# Patient Record
Sex: FEMALE | Race: BLACK OR AFRICAN AMERICAN | Employment: FULL TIME | URBAN - METROPOLITAN AREA
[De-identification: names, ages, dates, MRNs, and addresses within clinical notes are randomized per-mention and may not be internally consistent; named-entity substitution may affect disease eponyms.]

---

## 2017-01-06 ENCOUNTER — OFFICE VISIT (OUTPATIENT)
Dept: PRIMARY CARE CLINIC | Age: 57
End: 2017-01-06

## 2017-01-06 VITALS
HEIGHT: 65 IN | BODY MASS INDEX: 31.19 KG/M2 | OXYGEN SATURATION: 100 % | TEMPERATURE: 98.1 F | WEIGHT: 187.2 LBS | HEART RATE: 58 BPM | SYSTOLIC BLOOD PRESSURE: 128 MMHG | DIASTOLIC BLOOD PRESSURE: 82 MMHG | RESPIRATION RATE: 16 BRPM

## 2017-01-06 DIAGNOSIS — H65.03 BILATERAL ACUTE SEROUS OTITIS MEDIA, RECURRENCE NOT SPECIFIED: Primary | ICD-10-CM

## 2017-01-06 RX ORDER — LOSARTAN POTASSIUM 50 MG/1
TABLET ORAL
Refills: 3 | COMMUNITY
Start: 2016-12-23

## 2017-01-06 RX ORDER — AZITHROMYCIN 250 MG/1
TABLET, FILM COATED ORAL
Qty: 6 TAB | Refills: 0 | Status: SHIPPED | OUTPATIENT
Start: 2017-01-06 | End: 2017-01-11

## 2017-01-06 NOTE — PATIENT INSTRUCTIONS
pseudoephedrine hydrochloride 4 hour formula. Middle Ear Fluid: Care Instructions  Your Care Instructions    Fluid often builds up inside the ear during a cold or allergies. Usually the fluid drains away, but sometimes a small tube in the ear, called the eustachian tube, stays blocked for months. Symptoms of fluid buildup may include:  · Popping, ringing, or a feeling of fullness or pressure in the ear. · Trouble hearing. · Balance problems and dizziness. In most cases, you can treat yourself at home. Follow-up care is a key part of your treatment and safety. Be sure to make and go to all appointments, and call your doctor if you are having problems. It's also a good idea to know your test results and keep a list of the medicines you take. How can you care for yourself at home? · In most cases, the fluid clears up within a few months without treatment. You may need more tests if the fluid does not clear up after 3 months. · If your doctor prescribed antibiotics, take them as directed. Do not stop taking them just because you feel better. You need to take the full course of antibiotics. When should you call for help? Watch closely for changes in your health, and be sure to contact your doctor if:  · You have pain or a fever. · You have any new symptoms, such as hearing problems. · You do not get better as expected. Where can you learn more? Go to http://amena-dea.info/. Enter Z334 in the search box to learn more about \"Middle Ear Fluid: Care Instructions. \"  Current as of: July 29, 2016  Content Version: 11.1  © 8695-5439 [employer]. Care instructions adapted under license by [employer] (which disclaims liability or warranty for this information).  If you have questions about a medical condition or this instruction, always ask your healthcare professional. [address] any warranty or liability for your use of this information.

## 2017-01-06 NOTE — MR AVS SNAPSHOT
Visit Information Date & Time Provider Department Dept. Phone Encounter #  
 1/6/2017  3:45 PM Lourdes Chopra, [address] [phone] [id number] Follow-up Instructions Return if symptoms worsen or fail to improve. Upcoming Health Maintenance Date Due Hepatitis C Screening 1960 DTaP/Tdap/Td series (1 - Tdap) 8/4/1979 PAP AKA CERVICAL CYTOLOGY 8/4/1979 FOBT Q 1 YEAR AGE 50-75 8/4/2008 BREAST CANCER SCRN MAMMOGRAM 8/19/2011 INFLUENZA AGE 9 TO ADULT 8/1/2016 Allergies as of 1/6/2017  Review Complete On: 1/6/2017 By: Lourdes Chopra NP Severity Noted Reaction Type Reactions Codeine  06/11/2012   Intolerance Nausea and Vomiting Pcn [Penicillins]  06/11/2012   Systemic Hives, Swelling Percocet [Oxycodone-acetaminophen]  03/16/2016    Nausea and Vomiting  
 Sulfa (Sulfonamide Antibiotics)  06/11/2012   Intolerance Other (comments) Headache Current Immunizations  Reviewed on 12/11/2013 Name Date Influenza Vaccine 9/11/2013 Not reviewed this visit You Were Diagnosed With   
  
 Codes Comments Bilateral acute serous otitis media, recurrence not specified    -  Primary ICD-10-CM: H65.03 
ICD-9-CM: 381.01 Vitals BP Pulse Temp Resp Height(growth percentile) Weight(growth percentile) 128/82 (!) 58 98.1 °F (36.7 °C) (Oral) 16 5' 5\" (1.651 m) 187 lb 3.2 oz (84.9 kg) LMP SpO2 BMI OB Status Smoking Status 12/10/2013 100% 31.15 kg/m2 Postmenopausal Never Smoker Vitals History BMI and BSA Data Body Mass Index Body Surface Area  
 31.15 kg/m 2 1.97 m 2 Preferred Pharmacy Pharmacy Name Phone [id number] [place], [facility] [address] [phone] Your Updated Medication List  
  
   
This list is accurate as of: 1/6/17  5:17 PM.  Always use your most recent med list.  
  
  
  
  
 azithromycin 250 mg tablet Commonly known as:  Rachel Tubbs Take 2 tablets today, then take 1 tablet daily  
  
 losartan 50 mg tablet Commonly known as:  COZAAR  
TAKE 1-2 TABLETS BY MOUTH DAILY FOR BLOOD PRESSURE  
  
 meloxicam 15 mg tablet Commonly known as:  MOBIC Take 15 mg by mouth daily. Omeprazole delayed release 20 mg tablet Commonly known as:  PRILOSEC D/R Take 20 mg by mouth as needed. ZyrTEC 10 mg tablet Generic drug:  cetirizine Take 10 mg by mouth daily. Prescriptions Sent to Pharmacy Refills  
 azithromycin (ZITHROMAX) 250 mg tablet 0 Sig: Take 2 tablets today, then take 1 tablet daily Class: Normal  
 Pharmacy: [facility] #[id number] #: [phone] Follow-up Instructions Return if symptoms worsen or fail to improve. Patient Instructions   
 
pseudoephedrine hydrochloride 4 hour formula. Middle Ear Fluid: Care Instructions Your Care Instructions Fluid often builds up inside the ear during a cold or allergies. Usually the fluid drains away, but sometimes a small tube in the ear, called the eustachian tube, stays blocked for months. Symptoms of fluid buildup may include: · Popping, ringing, or a feeling of fullness or pressure in the ear. · Trouble hearing. · Balance problems and dizziness. In most cases, you can treat yourself at home. Follow-up care is a key part of your treatment and safety. Be sure to make and go to all appointments, and call your doctor if you are having problems. It's also a good idea to know your test results and keep a list of the medicines you take. How can you care for yourself at home? · In most cases, the fluid clears up within a few months without treatment. You may need more tests if the fluid does not clear up after 3 months. · If your doctor prescribed antibiotics, take them as directed.  Do not stop taking them just because you feel better. You need to take the full course of antibiotics. When should you call for help? Watch closely for changes in your health, and be sure to contact your doctor if: 
· You have pain or a fever. · You have any new symptoms, such as hearing problems. · You do not get better as expected. Where can you learn more? Go to http://amena-dea.info/. Enter R485 in the search box to learn more about \"Middle Ear Fluid: Care Instructions. \" Current as of: July 29, 2016 Content Version: 11.1 © 1522-5010 [employer]eeo. Care instructions adapted under license by [employer] (which disclaims liability or warranty for this information). If you have questions about a medical condition or this instruction, always ask your healthcare professional. [address] any warranty or liability for your use of this information. Introducing [facility]! Denzel De Leon introduces [employer] Interactive patient portal. Now you can access parts of your medical record, email your doctor's office, and request medication refills online. 1. In your internet browser, go to [email or web address] Ad [email or web address] 2. Click on the First Time User? Click Here link in the Sign In box. You will see the New Member Sign Up page. 3. Enter your [employer] Interactive Access Code exactly as it appears below. You will not need to use this code after youve completed the sign-up process. If you do not sign up before the expiration date, you must request a new code. · [employer] Interactive Access Code: [email or web address] Expires: 4/6/2017  5:17 PM 
 
4. Enter the last four digits of your Social Security Number (xxxx) and Date of Birth (mm/dd/yyyy) as indicated and click Submit. You will be taken to the next sign-up page. 5. Create a [employer] Interactive ID. This will be your [employer] login ID and cannot be changed, so think of one that is secure and easy to remember. 6. Create a [employer] password. You can change your password at any time. 7. Enter your Password Reset Question and Answer. This can be used at a later time if you forget your password. 8. Enter your e-mail address. You will receive e-mail notification when new information is available in [address] 9. Click Sign Up. You can now view and download portions of your medical record. 10. Click the Download Summary menu link to download a portable copy of your medical information. If you have questions, please visit the Frequently Asked Questions section of the [employer] website. Remember, [employer] is NOT to be used for urgent needs. For medical emergencies, dial 911. Now available from your iPhone and Android! Please provide this summary of care documentation to your next provider. Your primary care clinician is listed as [facility]. If you have any questions after today's visit, please call [phone].

## 2017-01-06 NOTE — PROGRESS NOTES
Chief Complaint   Patient presents with    Ear Pain     c/o L ear pain    Nasal Congestion   Pt here today c/o above symptoms x 3 days, has tried Tylenol Cold and Sinus to help with discomfort

## 2019-02-11 ENCOUNTER — OFFICE VISIT (OUTPATIENT)
Dept: PRIMARY CARE CLINIC | Age: 59
End: 2019-02-11

## 2019-02-11 VITALS
SYSTOLIC BLOOD PRESSURE: 114 MMHG | OXYGEN SATURATION: 99 % | WEIGHT: 182.6 LBS | BODY MASS INDEX: 30.42 KG/M2 | RESPIRATION RATE: 16 BRPM | TEMPERATURE: 97.7 F | HEART RATE: 62 BPM | HEIGHT: 65 IN | DIASTOLIC BLOOD PRESSURE: 77 MMHG

## 2019-02-11 DIAGNOSIS — H92.03 OTALGIA OF BOTH EARS: Primary | ICD-10-CM

## 2019-02-11 NOTE — PROGRESS NOTES
Benjamine Pallas is a 59y.o. year old female who presents for evaluation of pain in her ears for the last couple of days. On examination there are no s/s of infection noted, TM bilaterally are grey in color, no redness, no bulging. Patient reports she had cleaned them with peroxide and just wanted them to be checked for infection because they were irritated from the cleaning. Reviewed PmHx, RxHx, FmHx, SocHx, AllgHx and updated and dated in the chart. Review of Systems - negative except as listed above in the HPI    Objective:     Vitals:    02/11/19 1249   BP: 114/77   Pulse: 62   Resp: 16   Temp: 97.7 °F (36.5 °C)   TempSrc: Oral   SpO2: 99%   Weight: 182 lb 9.6 oz (82.8 kg)   Height: 5' 5\" (1.651 m)       Current Outpatient Medications   Medication Sig    losartan (COZAAR) 50 mg tablet TAKE 1-2 TABLETS BY MOUTH DAILY FOR BLOOD PRESSURE    Omeprazole delayed release (PRILOSEC D/R) 20 mg tablet Take 20 mg by mouth as needed.  cetirizine (ZYRTEC) 10 mg tablet Take 10 mg by mouth daily.  meloxicam (MOBIC) 15 mg tablet Take 15 mg by mouth daily. No current facility-administered medications for this visit. Physical Examination: . Physical Exam      Assessment/ Plan:   Diagnoses and all orders for this visit:    1. Otalgia of both ears         Follow-up Disposition: Not on File    I have discussed the diagnosis with the patient and the intended plan as seen in the above orders. The patient has received an after-visit summary and questions were answered concerning future plans. Pt conveyed understanding of plan. Medication Side Effects and Warnings were discussed with patient      Shalini Smith, NP    Visit Vitals  /77   Pulse 62   Temp 97.7 °F (36.5 °C) (Oral)   Resp 16   Ht 5' 5\" (1.651 m)   Wt 182 lb 9.6 oz (82.8 kg)   LMP 12/10/2013   SpO2 99%   BMI 30.39 kg/m²     Spoke with the patient regarding their blood pressure (BP) reading at today's visit.   The patient verbalized understanding of need to maintain BP lower than 140/90. The patient will follow up with their primary care physician regarding management and/or medications that may be needed. Drepssion Screening has been completed. The patient isdenies having a history of depression. The patient is nottaking medication and is being followed by their PCP at this time. This patient does  have a primary care physician. Referral was not given a referral at todays visit. Immunizations: The patient is current on their influenza immunization at this time. The patient does not   want to receive the influenza immunization today. Follow up instructions given at today's visit were verbalized by the patient/parent. The signs of infection are fever > 100.4, increase fatigue, change in mental status, or decrease in urinary output. The patient/parent verbalized understanding of taking medications prescribed during this visit as prescribed.

## 2019-02-13 NOTE — PATIENT INSTRUCTIONS
Earache: Care Instructions  Your Care Instructions    Even though infection is a common cause of ear pain, not all ear pain means an infection. If you have ear pain and don't have an infection, it could be because of a jaw problem, such as temporomandibular joint (TMJ) pain. Or it could be because of a neck problem. When ear discomfort or pain is mild or comes and goes without other symptoms, home treatment may be all you need. Follow-up care is a key part of your treatment and safety. Be sure to make and go to all appointments, and call your doctor if you are having problems. It's also a good idea to know your test results and keep a list of the medicines you take. How can you care for yourself at home? · Apply heat on the ear to ease pain. To apply heat, put a warm water bottle, a heating pad set on low, or a warm cloth on your ear. Do not go to sleep with a heating pad on your skin. · Take an over-the-counter pain medicine, such as acetaminophen (Tylenol), ibuprofen (Advil, Motrin), or naproxen (Aleve). Be safe with medicines. Read and follow all instructions on the label. · Do not take two or more pain medicines at the same time unless the doctor told you to. Many pain medicines have acetaminophen, which is Tylenol. Too much acetaminophen (Tylenol) can be harmful. · Never insert anything, such as a cotton swab or a virgie pin, into the ear. When should you call for help? Call your doctor now or seek immediate medical care if:    · You have new or worse symptoms of infection, such as:  ? Increased pain, swelling, warmth, or redness. ? Red streaks leading from the area. ? Pus draining from the area. ? A fever.    Watch closely for changes in your health, and be sure to contact your doctor if:    · You have new or worse discharge coming from the ear.     · You do not get better as expected. Where can you learn more? Go to http://amena-dea.info/.   Enter Z440 in the search box to learn more about \"Earache: Care Instructions. \"  Current as of: March 27, 2018  Content Version: 11.9  © 4346-8475 [employer]. Care instructions adapted under license by [employer] (which disclaims liability or warranty for this information). If you have questions about a medical condition or this instruction, always ask your healthcare professional. [address] any warranty or liability for your use of this information.

## 2019-03-20 ENCOUNTER — OFFICE VISIT (OUTPATIENT)
Dept: PRIMARY CARE CLINIC | Age: 59
End: 2019-03-20

## 2019-03-20 VITALS
WEIGHT: 180 LBS | OXYGEN SATURATION: 98 % | TEMPERATURE: 100 F | HEART RATE: 83 BPM | SYSTOLIC BLOOD PRESSURE: 124 MMHG | RESPIRATION RATE: 18 BRPM | DIASTOLIC BLOOD PRESSURE: 74 MMHG | HEIGHT: 65 IN | BODY MASS INDEX: 29.99 KG/M2

## 2019-03-20 DIAGNOSIS — J06.9 VIRAL UPPER RESPIRATORY TRACT INFECTION: ICD-10-CM

## 2019-03-20 DIAGNOSIS — R05.9 COUGH: Primary | ICD-10-CM

## 2019-03-20 RX ORDER — LEVOCETIRIZINE DIHYDROCHLORIDE 5 MG/1
5 TABLET, FILM COATED ORAL DAILY
Qty: 30 TAB | Refills: 0 | Status: SHIPPED | OUTPATIENT
Start: 2019-03-20 | End: 2019-04-19

## 2019-03-20 RX ORDER — BENZONATATE 200 MG/1
200 CAPSULE ORAL
Qty: 21 CAP | Refills: 0 | Status: SHIPPED | OUTPATIENT
Start: 2019-03-20 | End: 2019-03-27

## 2019-03-20 NOTE — PATIENT INSTRUCTIONS
Cough: Care Instructions Your Care Instructions A cough is your body's response to something that bothers your throat or airways. Many things can cause a cough. You might cough because of a cold or the flu, bronchitis, or asthma. Smoking, postnasal drip, allergies, and stomach acid that backs up into your throat also can cause coughs. A cough is a symptom, not a disease. Most coughs stop when the cause, such as a cold, goes away. You can take a few steps at home to cough less and feel better. Follow-up care is a key part of your treatment and safety. Be sure to make and go to all appointments, and call your doctor if you are having problems. It's also a good idea to know your test results and keep a list of the medicines you take. How can you care for yourself at home? · Drink lots of water and other fluids. This helps thin the mucus and soothes a dry or sore throat. Honey or lemon juice in hot water or tea may ease a dry cough. · Take cough medicine as directed by your doctor. · Prop up your head on pillows to help you breathe and ease a dry cough. · Try cough drops to soothe a dry or sore throat. Cough drops don't stop a cough. Medicine-flavored cough drops are no better than candy-flavored drops or hard candy. · Do not smoke. Avoid secondhand smoke. If you need help quitting, talk to your doctor about stop-smoking programs and medicines. These can increase your chances of quitting for good. When should you call for help? Call 911 anytime you think you may need emergency care.  For example, call if: 
  · You have severe trouble breathing.  
 Call your doctor now or seek immediate medical care if: 
  · You cough up blood.  
  · You have new or worse trouble breathing.  
  · You have a new or higher fever.  
  · You have a new rash.  
 Watch closely for changes in your health, and be sure to contact your doctor if: 
  · You cough more deeply or more often, especially if you notice more mucus or a change in the color of your mucus.  
  · You have new symptoms, such as a sore throat, an earache, or sinus pain.  
  · You do not get better as expected. Where can you learn more? Go to http://amena-dea.info/. Enter D279 in the search box to learn more about \"Cough: Care Instructions. \" Current as of: September 5, 2018 Content Version: 11.9 © 8519-2129 [employer]. Care instructions adapted under license by [employer] (which disclaims liability or warranty for this information). If you have questions about a medical condition or this instruction, always ask your healthcare professional. [place] [address] any warranty or liability for your use of this information.

## 2019-03-20 NOTE — PROGRESS NOTES
Chief Complaint Patient presents with  Cough Pt c/o cough x 1 week, denies nausea vomiting or fever,states she has tried otc tylenol cold and flu to help with discomfort This note will not be viewable in [address]

## 2019-03-20 NOTE — PROGRESS NOTES
Subjective:  
Neri Gutierrez is a 59 y.o. female who complains of coryza, congestion and productive cough for 5 days, gradually improving since that time. She denies a history of anorexia, chest pain, chills, dizziness, fatigue, fevers, myalgias, nausea, shortness of breath, sweats, vomiting, weakness, weight loss, wheezing and cough. Evaluation to date: none. Treatment to date: OTC products. Patient does not smoke cigarettes. Relevant PMH: No pertinent additional PMH. Patient Active Problem List  
Diagnosis Code  Menorrhagia N92.0  Fibroids D21.9 Patient Active Problem List  
 Diagnosis Date Noted  Menorrhagia 2013  Fibroids 2013 Current Outpatient Medications Medication Sig Dispense Refill  levocetirizine (XYZAL) 5 mg tablet Take 1 Tab by mouth daily for 30 days. 30 Tab 0  
 benzonatate (TESSALON) 200 mg capsule Take 1 Cap by mouth three (3) times daily as needed for Cough for up to 7 days. 21 Cap 0  
 losartan (COZAAR) 50 mg tablet TAKE 1-2 TABLETS BY MOUTH DAILY FOR BLOOD PRESSURE  3  
 Omeprazole delayed release (PRILOSEC D/R) 20 mg tablet Take 20 mg by mouth as needed.  meloxicam (MOBIC) 15 mg tablet Take 15 mg by mouth daily. Allergies Allergen Reactions  Codeine Nausea and Vomiting  Pcn [Penicillins] Hives and Swelling  Percocet [Oxycodone-Acetaminophen] Nausea and Vomiting  Sulfa (Sulfonamide Antibiotics) Other (comments) Headache Past Medical History:  
Diagnosis Date  Arthritis  GERD (gastroesophageal reflux disease) Past Surgical History:  
Procedure Laterality Date  COLONOSCOPY    
 HX  SECTION    
 HX GYN  13 TOTAL LAPAROSCOPIC HYSTERECTOMY AND BILATERAL SALPINGO OOPHRECTOMY & CYSTOSCOPY  
 HX ORTHOPAEDIC    
 knee scope Family History Problem Relation Age of Onset  No Known Problems Mother  No Known Problems Father  No Known Problems Sister  No Known Problems Brother  No Known Problems Maternal Aunt  No Known Problems Maternal Uncle  No Known Problems Paternal Aunt  No Known Problems Paternal Uncle  No Known Problems Maternal Grandmother  No Known Problems Maternal Grandfather  No Known Problems Paternal Grandmother  No Known Problems Paternal Grandfather Social History Tobacco Use  Smoking status: Never Smoker  Smokeless tobacco: Never Used Substance Use Topics  Alcohol use: No  
  
 
Review of Systems Pertinent items are noted in HPI. Objective:  
 
Visit Vitals /74 (BP 1 Location: Left arm, BP Patient Position: Sitting) Pulse 83 Temp 100 °F (37.8 °C) (Oral) Resp 18 Ht 5' 5\" (1.651 m) Wt 180 lb (81.6 kg) LMP 12/10/2013 SpO2 98% BMI 29.95 kg/m² General:  alert, cooperative, no distress Eyes: conjunctivae/corneas clear. PERRL, EOM's intact. Fundi benign Ears: normal TM's and external ear canals AU Sinuses: Normal paranasal sinuses without tenderness Mouth:  Lips, mucosa, and tongue normal. Teeth and gums normal  
Neck: supple, symmetrical, trachea midline and no adenopathy. Heart: S1 and S2 normal, no murmurs noted. Lungs: clear to auscultation bilaterally Abdomen: soft, non-tender. Bowel sounds normal. No masses,  no organomegaly Assessment/Plan:  
viral upper respiratory illness Discussed dx and tx of URIs Suggested symptomatic OTC remedies. RTC prn. Discussed diagnosis and treatment of viral URIs. Discussed the importance of avoiding unnecessary antibiotic therapy. ICD-10-CM ICD-9-CM 1. Cough R05 786.2 levocetirizine (XYZAL) 5 mg tablet  
   benzonatate (TESSALON) 200 mg capsule 2. Viral upper respiratory tract infection J06.9 465.9 Ana Isela

## 2019-09-26 ENCOUNTER — OFFICE VISIT (OUTPATIENT)
Dept: PRIMARY CARE CLINIC | Age: 59
End: 2019-09-26

## 2019-09-26 VITALS
BODY MASS INDEX: 29.26 KG/M2 | SYSTOLIC BLOOD PRESSURE: 129 MMHG | DIASTOLIC BLOOD PRESSURE: 83 MMHG | OXYGEN SATURATION: 98 % | HEART RATE: 60 BPM | RESPIRATION RATE: 16 BRPM | HEIGHT: 65 IN | WEIGHT: 175.6 LBS | TEMPERATURE: 98.2 F

## 2019-09-26 DIAGNOSIS — J06.9 VIRAL UPPER RESPIRATORY TRACT INFECTION: Primary | ICD-10-CM

## 2019-09-26 RX ORDER — LOSARTAN POTASSIUM 25 MG/1
TABLET ORAL
COMMUNITY
Start: 2016-04-26

## 2019-09-26 RX ORDER — ESTRADIOL 0.1 MG/G
CREAM VAGINAL
COMMUNITY
Start: 2019-06-26

## 2019-09-26 RX ORDER — DEXTROMETHORPHAN HYDROBROMIDE, GUAIFENESIN 5; 100 MG/5ML; MG/5ML
LIQUID ORAL
COMMUNITY
Start: 2017-05-20

## 2019-09-26 RX ORDER — LEVOCETIRIZINE DIHYDROCHLORIDE 5 MG/1
TABLET, FILM COATED ORAL
COMMUNITY
End: 2019-09-26 | Stop reason: ALTCHOICE

## 2019-09-26 NOTE — PROGRESS NOTES
Subjective:   Azar Alvarado is a 62 y.o. female who complains of congestion, sore throat, nasal blockage and post nasal drip for 3 days, gradually improving since that time. She denies a history of shortness of breath and wheezing. Evaluation to date: none. Treatment to date: OTC products. Patient does not smoke cigarettes. Relevant PMH: No pertinent additional PMH. Patient Active Problem List   Diagnosis Code    Menorrhagia N92.0    Fibroids D21.9     Patient Active Problem List    Diagnosis Date Noted    Menorrhagia 2013    Fibroids 2013     Current Outpatient Medications   Medication Sig Dispense Refill    acetaminophen (TYLENOL ARTHRITIS PAIN) 650 mg TbER Tylenol Arthritis Pain 650 mg tablet,extended release   Prescribed by non [facility] MD      estradiol (ESTRACE) 0.01 % (0.1 mg/gram) vaginal cream Estrace 0.01% (0.1 mg/gram) vaginal cream   apply to vaginal tissues as needed      losartan (COZAAR) 25 mg tablet losartan 25 mg tablet   Prescribed by non 845/776 Anh Leigh MD      losartan (COZAAR) 50 mg tablet TAKE 1-2 TABLETS BY MOUTH DAILY FOR BLOOD PRESSURE  3    Omeprazole delayed release (PRILOSEC D/R) 20 mg tablet Take 20 mg by mouth as needed.        Allergies   Allergen Reactions    Codeine Nausea and Vomiting    Pcn [Penicillins] Hives and Swelling    Percocet [Oxycodone-Acetaminophen] Nausea and Vomiting    Sulfa (Sulfonamide Antibiotics) Other (comments)     Headache       Past Medical History:   Diagnosis Date    Arthritis     GERD (gastroesophageal reflux disease)      Past Surgical History:   Procedure Laterality Date    COLONOSCOPY      HX  SECTION      HX GYN  11    TOTAL LAPAROSCOPIC HYSTERECTOMY AND BILATERAL SALPINGO OOPHRECTOMY & CYSTOSCOPY    HX ORTHOPAEDIC      knee scope     Family History   Problem Relation Age of Onset    No Known Problems Mother     No Known Problems Father     No Known Problems Sister     No Known Problems Brother     No Known Problems Maternal Aunt     No Known Problems Maternal Uncle     No Known Problems Paternal Aunt     No Known Problems Paternal Uncle     No Known Problems Maternal Grandmother     No Known Problems Maternal Grandfather     No Known Problems Paternal Grandmother     No Known Problems Paternal Grandfather      Social History     Tobacco Use    Smoking status: Never Smoker    Smokeless tobacco: Never Used   Substance Use Topics    Alcohol use: No        Review of Systems  Pertinent items are noted in HPI. Objective:     Visit Vitals  /83 (BP 1 Location: Left arm, BP Patient Position: Sitting)   Pulse 60   Temp 98.2 °F (36.8 °C) (Oral)   Resp 16   Ht 5' 5\" (1.651 m)   Wt 175 lb 9.6 oz (79.7 kg)   LMP 12/10/2013   SpO2 98%   BMI 29.22 kg/m²     General:  alert, cooperative, no distress   Eyes: negative   Ears: normal TM's and external ear canals AU   Sinuses: Normal paranasal sinuses without tenderness   Mouth:  Lips, mucosa, and tongue normal. Teeth and gums normal   Neck: supple, symmetrical, trachea midline and no adenopathy. Heart: S1 and S2 normal, no murmurs noted. Lungs: clear to auscultation bilaterally   Abdomen: soft, non-tender. Bowel sounds normal. No masses,  no organomegaly        Assessment/Plan:   viral upper respiratory illness  Suggested symptomatic OTC remedies. RTC prn. Discussed diagnosis and treatment of viral URIs. Discussed the importance of avoiding unnecessary antibiotic therapy. ICD-10-CM ICD-9-CM    1. Viral upper respiratory tract infection J06.9 465.9    .

## 2019-09-26 NOTE — PROGRESS NOTES
Alexa Cedeno is a 62 y.o. female    Room:4    Chief Complaint   Patient presents with    Sinus Infection     Pt States \" congestion, may have fever on and off ,just want to check this out'. started monday, took otc cold and flu. Visit Vitals  /83 (BP 1 Location: Left arm, BP Patient Position: Sitting)   Pulse 60   Temp 98.2 °F (36.8 °C) (Oral)   Resp 16   Ht 5' 5\" (1.651 m)   Wt 175 lb 9.6 oz (79.7 kg)   SpO2 98%   BMI 29.22 kg/m²       Pain Scale: 0 - No pain/10    1. Have you been to the ER, urgent care clinic since your last visit? Hospitalized since your last visit? No    2. Have you seen or consulted any other health care providers outside of the [address] [place] [address] since your last visit? Include any pap smears or colon screening.  No

## 2019-09-26 NOTE — PATIENT INSTRUCTIONS

## 2023-07-18 LAB — MAMMOGRAPHY, EXTERNAL: NORMAL

## 2023-10-02 ENCOUNTER — OFFICE VISIT (OUTPATIENT)
Facility: CLINIC | Age: 63
End: 2023-10-02
Payer: COMMERCIAL

## 2023-10-02 VITALS
RESPIRATION RATE: 16 BRPM | BODY MASS INDEX: 26.99 KG/M2 | OXYGEN SATURATION: 98 % | WEIGHT: 162 LBS | HEIGHT: 65 IN | HEART RATE: 60 BPM | TEMPERATURE: 98.3 F | DIASTOLIC BLOOD PRESSURE: 72 MMHG | SYSTOLIC BLOOD PRESSURE: 136 MMHG

## 2023-10-02 DIAGNOSIS — Z13.21 ENCOUNTER FOR VITAMIN DEFICIENCY SCREENING: ICD-10-CM

## 2023-10-02 DIAGNOSIS — R63.4 WEIGHT LOSS: ICD-10-CM

## 2023-10-02 DIAGNOSIS — Z11.4 SCREENING FOR HIV (HUMAN IMMUNODEFICIENCY VIRUS): ICD-10-CM

## 2023-10-02 DIAGNOSIS — Z11.59 NEED FOR HEPATITIS C SCREENING TEST: ICD-10-CM

## 2023-10-02 DIAGNOSIS — Z76.89 ENCOUNTER TO ESTABLISH CARE: Primary | ICD-10-CM

## 2023-10-02 DIAGNOSIS — I10 PRIMARY HYPERTENSION: ICD-10-CM

## 2023-10-02 PROCEDURE — 1123F ACP DISCUSS/DSCN MKR DOCD: CPT | Performed by: INTERNAL MEDICINE

## 2023-10-02 PROCEDURE — 3075F SYST BP GE 130 - 139MM HG: CPT | Performed by: INTERNAL MEDICINE

## 2023-10-02 PROCEDURE — 3078F DIAST BP <80 MM HG: CPT | Performed by: INTERNAL MEDICINE

## 2023-10-02 PROCEDURE — 99204 OFFICE O/P NEW MOD 45 MIN: CPT | Performed by: INTERNAL MEDICINE

## 2023-10-02 RX ORDER — CETIRIZINE HYDROCHLORIDE 10 MG/1
10 TABLET, CHEWABLE ORAL DAILY
COMMUNITY

## 2023-10-02 RX ORDER — VALSARTAN 160 MG/1
160 TABLET ORAL DAILY
COMMUNITY
Start: 2023-09-12

## 2023-10-02 SDOH — ECONOMIC STABILITY: FOOD INSECURITY: WITHIN THE PAST 12 MONTHS, YOU WORRIED THAT YOUR FOOD WOULD RUN OUT BEFORE YOU GOT MONEY TO BUY MORE.: NEVER TRUE

## 2023-10-02 SDOH — ECONOMIC STABILITY: HOUSING INSECURITY
IN THE LAST 12 MONTHS, WAS THERE A TIME WHEN YOU DID NOT HAVE A STEADY PLACE TO SLEEP OR SLEPT IN A SHELTER (INCLUDING NOW)?: NO

## 2023-10-02 SDOH — ECONOMIC STABILITY: INCOME INSECURITY: HOW HARD IS IT FOR YOU TO PAY FOR THE VERY BASICS LIKE FOOD, HOUSING, MEDICAL CARE, AND HEATING?: NOT HARD AT ALL

## 2023-10-02 SDOH — ECONOMIC STABILITY: FOOD INSECURITY: WITHIN THE PAST 12 MONTHS, THE FOOD YOU BOUGHT JUST DIDN'T LAST AND YOU DIDN'T HAVE MONEY TO GET MORE.: NEVER TRUE

## 2023-10-02 ASSESSMENT — PATIENT HEALTH QUESTIONNAIRE - PHQ9
SUM OF ALL RESPONSES TO PHQ QUESTIONS 1-9: 0
1. LITTLE INTEREST OR PLEASURE IN DOING THINGS: 0
2. FEELING DOWN, DEPRESSED OR HOPELESS: 0
SUM OF ALL RESPONSES TO PHQ QUESTIONS 1-9: 0
SUM OF ALL RESPONSES TO PHQ9 QUESTIONS 1 & 2: 0
SUM OF ALL RESPONSES TO PHQ QUESTIONS 1-9: 0
SUM OF ALL RESPONSES TO PHQ QUESTIONS 1-9: 0

## 2023-10-02 NOTE — PROGRESS NOTES
Matt Pleitez  Identified pt with two pt identifiers(name and ). Chief Complaint   Patient presents with    New Patient     Room 4A // previous pcp - Dr. Maylin Malone       Reviewed record In preparation for visit and have obtained necessary documentation. 1. Have you been to the ER, urgent care clinic or hospitalized since your last visit? No     2. Have you seen or consulted any other health care providers outside of the [address] [place] [address] Avenue since your last visit? Include any pap smears or colon screening. No    Patient has an advance directive. Vitals reviewed with provider. Health Maintenance reviewed:     Health Maintenance Due   Topic    COVID-19 Vaccine (1)    Depression Screen     HIV screen     Hepatitis C screen     Cervical cancer screen     Diabetes screen     Lipids     Colorectal Cancer Screen     Breast cancer screen     Shingles vaccine (1 of 2)    DEXA (modify frequency per FRAX score)     Flu vaccine (1)    Pneumococcal 65+ years Vaccine (1 - PCV)          Wt Readings from Last 3 Encounters:   10/02/23 162 lb (73.5 kg)        Temp Readings from Last 3 Encounters:   No data found for Temp        BP Readings from Last 3 Encounters:   No data found for BP        Pulse Readings from Last 3 Encounters:   No data found for Pulse             No data to display                  Learning Assessment:   :         10/2/2023    11:10 AM   [id number] AMB LEARNING ASSESSMENT   Primary Learner Patient   level of education 4 YEARS OF COLLEGE   Barriers Factors NONE   Primary Language ENGLISH   Learning Preference OTHER (COMMENT)   Answered By Patient   Relationship to Learner SELF         Fall Risk Assessment:         10/2/2023    10:56 AM   Amb Fall Risk Assessment and TUG Test   Do you feel unsteady or are you worried about falling?  no   2 or more falls in past year? no   Fall with injury in past year?  no         Abuse Screening:         10/2/2023    10:00 AM   AMB Abuse Screening   Do you

## 2023-10-02 NOTE — PROGRESS NOTES
CC:  Chief Complaint   Patient presents with    New Patient     Room 4A // previous pcp - Dr. Muir  is a 70 y.o. female. Presents to establish care. Former PCP: Dr. Ney Covington. She has HTN and OA s/p right knee TKR in 3/2023. No complaints. Takes valsartan 160 mg daily for HTN. Not on low-salt diet; eats salted Tortilla chips. Walks about 1 hr every morning for exercise. Has been losing weight intentionally. Denies headaches, CP, SOB, dizziness, heart palpitations, or leg swelling. Has chronic high WBC count. Has seen 2 hematologists, Dr. Agustina Bradford (now retired) and Dr. Refugio Mohamud. Was found to have no cancer. Follows with Dr. Refugio Mohamud yearly. Soc Hx  . Has 1 daughter and 1 granddaughter (3 mons old). 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 Never smoker. Drinks occ alcohol. Denies recreational drug use. Drinks 2 cups of coffee daily in am. Her father is Solomon Mckeon who is a patient of Dr. Mely Petty. Fam Hx   Father- HTN, thyroid problems  Mother - had part of kidney removed, HTN, passed from heart condition  Mat GF: pancreatic ca  Mat uncle: lung ca    Health Maintenance  COVID vaccine: had 4 vaccines, last in Aug 2023  Flu vaccine: had  Pneumonia vaccine: had  Shingles vaccine: had   Pap smear: , Dr. Childs Locket: , [address] [place] [address] Drive: , [address]  Colonoscopy: , repeat in 10 yrs     Other Providers: Dr. Lakisha Eric (Gyn), Dr. Refugio Mohamud (Hematology), [facility] for eyes    ROS  Occasional heartburn. A complete review of systems was performed and is negative except for those mentioned in the HPI.     Patient Active Problem List   Diagnosis    Menorrhagia    Fibroids     Past Medical History:   Diagnosis Date    Arthritis     GERD (gastroesophageal reflux disease)     HTN (hypertension)      Past Surgical History:   Procedure Laterality Date    BUNIONECTOMY Bilateral      SECTION

## 2023-10-06 ENCOUNTER — TELEPHONE (OUTPATIENT)
Facility: CLINIC | Age: 63
End: 2023-10-06

## 2023-10-06 LAB
25(OH)D3+25(OH)D2 SERPL-MCNC: 29.1 NG/ML (ref 30–100)
ALBUMIN SERPL-MCNC: 4.3 G/DL (ref 3.9–4.9)
ALBUMIN/GLOB SERPL: 1.3 {RATIO} (ref 1.2–2.2)
ALP SERPL-CCNC: 82 IU/L (ref 44–121)
ALT SERPL-CCNC: 10 IU/L (ref 0–32)
AST SERPL-CCNC: 20 IU/L (ref 0–40)
BASOPHILS # BLD AUTO: 0 X10E3/UL (ref 0–0.2)
BASOPHILS NFR BLD AUTO: 1 %
BILIRUB SERPL-MCNC: 0.7 MG/DL (ref 0–1.2)
BUN SERPL-MCNC: 11 MG/DL (ref 8–27)
BUN/CREAT SERPL: 15 (ref 12–28)
CALCIUM SERPL-MCNC: 9.8 MG/DL (ref 8.7–10.3)
CHLORIDE SERPL-SCNC: 104 MMOL/L (ref 96–106)
CHOLEST SERPL-MCNC: 172 MG/DL (ref 100–199)
CO2 SERPL-SCNC: 24 MMOL/L (ref 20–29)
CREAT SERPL-MCNC: 0.71 MG/DL (ref 0.57–1)
EGFRCR SERPLBLD CKD-EPI 2021: 94 ML/MIN/1.73
EOSINOPHIL # BLD AUTO: 0.1 X10E3/UL (ref 0–0.4)
EOSINOPHIL NFR BLD AUTO: 3 %
ERYTHROCYTE [DISTWIDTH] IN BLOOD BY AUTOMATED COUNT: 14.5 % (ref 11.7–15.4)
GLOBULIN SER CALC-MCNC: 3.2 G/DL (ref 1.5–4.5)
GLUCOSE SERPL-MCNC: 93 MG/DL (ref 70–99)
HCT VFR BLD AUTO: 42.1 % (ref 34–46.6)
HCV AB SERPL QL IA: NORMAL
HCV IGG SERPL QL IA: NON REACTIVE
HDLC SERPL-MCNC: 72 MG/DL
HGB BLD-MCNC: 13.6 G/DL (ref 11.1–15.9)
HIV 1+2 AB+HIV1 P24 AG SERPL QL IA: NON REACTIVE
IMM GRANULOCYTES # BLD AUTO: 0 X10E3/UL (ref 0–0.1)
IMM GRANULOCYTES NFR BLD AUTO: 0 %
LDLC SERPL CALC-MCNC: 87 MG/DL (ref 0–99)
LYMPHOCYTES # BLD AUTO: 1.7 X10E3/UL (ref 0.7–3.1)
LYMPHOCYTES NFR BLD AUTO: 69 %
MCH RBC QN AUTO: 27.8 PG (ref 26.6–33)
MCHC RBC AUTO-ENTMCNC: 32.3 G/DL (ref 31.5–35.7)
MCV RBC AUTO: 86 FL (ref 79–97)
MONOCYTES # BLD AUTO: 0.3 X10E3/UL (ref 0.1–0.9)
MONOCYTES NFR BLD AUTO: 11 %
MORPHOLOGY BLD-IMP: ABNORMAL
NEUTROPHILS # BLD AUTO: 0.4 X10E3/UL (ref 1.4–7)
NEUTROPHILS NFR BLD AUTO: 16 %
PLATELET # BLD AUTO: 256 X10E3/UL (ref 150–450)
POTASSIUM SERPL-SCNC: 4.4 MMOL/L (ref 3.5–5.2)
PROT SERPL-MCNC: 7.5 G/DL (ref 6–8.5)
RBC # BLD AUTO: 4.89 X10E6/UL (ref 3.77–5.28)
SODIUM SERPL-SCNC: 143 MMOL/L (ref 134–144)
TRIGL SERPL-MCNC: 67 MG/DL (ref 0–149)
TSH SERPL DL<=0.005 MIU/L-ACNC: 1.75 UIU/ML (ref 0.45–4.5)
VLDLC SERPL CALC-MCNC: 13 MG/DL (ref 5–40)
WBC # BLD AUTO: 2.5 X10E3/UL (ref 3.4–10.8)

## 2023-10-06 NOTE — TELEPHONE ENCOUNTER
On Call Phone Message: Call from [employer] at 3:48 am to report a critically low absolute neutrophil count of 0.4. [employer] phone no.: [phone], reference # [id number].

## 2023-11-13 RX ORDER — VALSARTAN 160 MG/1
160 TABLET ORAL DAILY
Qty: 90 TABLET | Refills: 1 | Status: SHIPPED | OUTPATIENT
Start: 2023-11-13

## 2023-11-13 NOTE — TELEPHONE ENCOUNTER
PCP: Radha Lim MD     Last appt:  10/2/2023      Future Appointments   Date Time Provider [address]   1/12/2024  8:50 AM Radha Lim MD [facility] AMB          Requested Prescriptions     Pending Prescriptions Disp Refills    valsartan (DIOVAN) 160 MG tablet 30 tablet      Sig: Take 1 tablet by mouth daily

## 2024-01-12 ENCOUNTER — OFFICE VISIT (OUTPATIENT)
Facility: CLINIC | Age: 64
End: 2024-01-12
Payer: COMMERCIAL

## 2024-01-12 VITALS
OXYGEN SATURATION: 98 % | HEIGHT: 65 IN | WEIGHT: 162 LBS | HEART RATE: 76 BPM | DIASTOLIC BLOOD PRESSURE: 78 MMHG | BODY MASS INDEX: 26.99 KG/M2 | TEMPERATURE: 98 F | SYSTOLIC BLOOD PRESSURE: 134 MMHG | RESPIRATION RATE: 16 BRPM

## 2024-01-12 DIAGNOSIS — I10 PRIMARY HYPERTENSION: Primary | ICD-10-CM

## 2024-01-12 DIAGNOSIS — E55.9 VITAMIN D DEFICIENCY: ICD-10-CM

## 2024-01-12 PROCEDURE — 99213 OFFICE O/P EST LOW 20 MIN: CPT | Performed by: INTERNAL MEDICINE

## 2024-01-12 PROCEDURE — 3075F SYST BP GE 130 - 139MM HG: CPT | Performed by: INTERNAL MEDICINE

## 2024-01-12 PROCEDURE — 3078F DIAST BP <80 MM HG: CPT | Performed by: INTERNAL MEDICINE

## 2024-01-12 PROCEDURE — 1123F ACP DISCUSS/DSCN MKR DOCD: CPT | Performed by: INTERNAL MEDICINE

## 2024-01-12 ASSESSMENT — PATIENT HEALTH QUESTIONNAIRE - PHQ9
SUM OF ALL RESPONSES TO PHQ9 QUESTIONS 1 & 2: 0
SUM OF ALL RESPONSES TO PHQ QUESTIONS 1-9: 0
1. LITTLE INTEREST OR PLEASURE IN DOING THINGS: 0
2. FEELING DOWN, DEPRESSED OR HOPELESS: 0

## 2024-01-12 NOTE — PROGRESS NOTES
Valeri Goncalves  Identified pt with two pt identifiers(name and ).  Chief Complaint   Patient presents with    Hypertension       1. Have you been to the ER, urgent care clinic since your last visit?  Hospitalized since your last visit? [facility] a week before [place] dx with Covid     2. Have you seen or consulted any other health care providers outside of the [facility] since your last visit?  Include any pap smears or colon screening. NO  Pt has had her Covid, Shingles and RSV vaccine.     Provider notified of reason for visit, vitals and flowsheets obtained on patients.     Patient received paperwork for advance directive during previous visit but has not completed at this time     Reviewed record In preparation for visit, huddled with provider and have obtained necessary documentation      Health Maintenance Due   Topic    COVID-19 Vaccine (1)    Breast cancer screen     Shingles vaccine (1 of 2)    DEXA (modify frequency per FRAX score)     Respiratory Syncytial Virus (RSV) Pregnant or age 60 yrs+ (1 - 1-dose 60+ series)    Pneumococcal 65+ years Vaccine (1 - PCV)       Wt Readings from Last 3 Encounters:   10/02/23 73.5 kg (162 lb)     Temp Readings from Last 3 Encounters:   10/02/23 98.3 °F (36.8 °C) (Oral)     BP Readings from Last 3 Encounters:   10/02/23 136/72     Pulse Readings from Last 3 Encounters:   10/02/23 60          No data to display                  Learning Assessment:  :         10/2/2023    11:10 AM   [facility] AMB LEARNING ASSESSMENT   Primary Learner Patient   level of education 4 YEARS OF COLLEGE   Barriers Factors NONE   Primary Language ENGLISH   Learning Preference OTHER (COMMENT)   Answered By Patient   Relationship to Learner SELF       Fall Risk Assessment:  :         10/2/2023    10:56 AM   Amb Fall Risk Assessment and TUG Test   Do you feel unsteady or are you worried about falling?  no   2 or more falls in past year? no   Fall with injury in past year? no

## 2024-01-12 NOTE — PROGRESS NOTES
Chief Complaint   Patient presents with    Hypertension       HISTORY OF PRESENT ILLNESS  Valeri Goncalves is a 63 y.o. female    Presents for 3 month follow up evaluation of HTN.     Home BP: 139/82, 132/72. Brought home BP monitor to clinic. It read 123/56 which she noted was lower than usual. Clinic monitor: 134/78. Denies headaches, CP, SOB, dizziness, heart palpitations, or leg swelling. Gets regular exercise by walking, usually 10,000-15,000 steps a day.    Seen at [facility] a week before [place] for COVID-19.    Was having neck pain. Saw a chiropractor who told her she had arthritis at her neck. Chiropractic adjustments helping.    Patient Active Problem List   Diagnosis    Menorrhagia    Fibroids     Past Medical History:   Diagnosis Date    Arthritis     GERD (gastroesophageal reflux disease)     HTN (hypertension)     Osteoarthritis      Allergies   Allergen Reactions    Codeine Nausea And Vomiting and Nausea Only    Sulfa Antibiotics      Other reaction(s): Headache, Other (comments)  Headache    Penicillins Hives and Swelling    Oxycodone-Acetaminophen Nausea And Vomiting       Current Outpatient Medications   Medication Sig Dispense Refill    valsartan (DIOVAN) 160 MG tablet Take 1 tablet by mouth daily 90 tablet 1    cetirizine (ZYRTEC) 10 MG chewable tablet Take 1 tablet by mouth daily      NAPROXEN PO Take by mouth      Acetaminophen (TYLENOL PO) Take by mouth       No current facility-administered medications for this visit.       PHYSICAL EXAM  /78 (Site: Left Upper Arm, Position: Sitting)   Pulse 76   Temp 98 °F (36.7 °C) (Oral)   Resp 16   Ht 1.651 m (5' 5\")   Wt 73.5 kg (162 lb)   SpO2 98%   BMI 26.96 kg/m²     General: Well-developed and well-nourished, no distress.  HEENT:  Head normocephalic/atraumatic, no scleral icterus  Neck: Supple. No carotid bruits, JVD, lymphadenopathy, or thyromegaly.  Lungs:  Clear to auscultation bilaterally. Good air movement.  Heart:  Regular

## 2024-02-09 RX ORDER — VALSARTAN 160 MG/1
160 TABLET ORAL DAILY
Qty: 90 TABLET | Refills: 3 | Status: SHIPPED | OUTPATIENT
Start: 2024-02-09

## 2024-02-09 NOTE — TELEPHONE ENCOUNTER
PCP: Lisette Carlson MD     Last appt:  1/12/2024      Future Appointments   Date Time Provider Department Center   10/14/2024  8:10 AM Lisette Carlson MD [facility] AMB          Requested Prescriptions     Pending Prescriptions Disp Refills    valsartan (DIOVAN) 160 MG tablet [Pharmacy Med Name: VALSARTAN 160 MG Tablet] 90 tablet 3     Sig: TAKE 1 TABLET EVERY DAY

## 2024-07-29 LAB — MAMMOGRAPHY, EXTERNAL: NORMAL

## 2024-10-11 ENCOUNTER — LAB (OUTPATIENT)
Facility: CLINIC | Age: 64
End: 2024-10-11

## 2024-10-11 DIAGNOSIS — E55.9 VITAMIN D DEFICIENCY: ICD-10-CM

## 2024-10-11 DIAGNOSIS — I10 PRIMARY HYPERTENSION: ICD-10-CM

## 2024-10-11 LAB
25(OH)D3 SERPL-MCNC: 44.8 NG/ML (ref 30–100)
ALBUMIN SERPL-MCNC: 3.8 G/DL (ref 3.5–5)
ALBUMIN/GLOB SERPL: 1.1 (ref 1.1–2.2)
ALP SERPL-CCNC: 84 U/L (ref 45–117)
ALT SERPL-CCNC: 13 U/L (ref 12–78)
ANION GAP SERPL CALC-SCNC: 2 MMOL/L (ref 2–12)
AST SERPL-CCNC: 19 U/L (ref 15–37)
BILIRUB SERPL-MCNC: 0.5 MG/DL (ref 0.2–1)
BUN SERPL-MCNC: 12 MG/DL (ref 6–20)
BUN/CREAT SERPL: 16 (ref 12–20)
CALCIUM SERPL-MCNC: 9.7 MG/DL (ref 8.5–10.1)
CHLORIDE SERPL-SCNC: 109 MMOL/L (ref 97–108)
CHOLEST SERPL-MCNC: 166 MG/DL
CO2 SERPL-SCNC: 28 MMOL/L (ref 21–32)
CREAT SERPL-MCNC: 0.76 MG/DL (ref 0.55–1.02)
GLOBULIN SER CALC-MCNC: 3.4 G/DL (ref 2–4)
GLUCOSE SERPL-MCNC: 86 MG/DL (ref 65–100)
HDLC SERPL-MCNC: 84 MG/DL
HDLC SERPL: 2 (ref 0–5)
LDLC SERPL CALC-MCNC: 70.8 MG/DL (ref 0–100)
POTASSIUM SERPL-SCNC: 4.8 MMOL/L (ref 3.5–5.1)
PROT SERPL-MCNC: 7.2 G/DL (ref 6.4–8.2)
SODIUM SERPL-SCNC: 139 MMOL/L (ref 136–145)
TRIGL SERPL-MCNC: 56 MG/DL
VLDLC SERPL CALC-MCNC: 11.2 MG/DL

## 2024-10-12 LAB
BASOPHILS # BLD: 0 K/UL (ref 0–0.1)
BASOPHILS NFR BLD: 1 % (ref 0–1)
DIFFERENTIAL METHOD BLD: ABNORMAL
EOSINOPHIL # BLD: 0 K/UL (ref 0–0.4)
EOSINOPHIL NFR BLD: 1 % (ref 0–7)
ERYTHROCYTE [DISTWIDTH] IN BLOOD BY AUTOMATED COUNT: 14.6 % (ref 11.5–14.5)
HCT VFR BLD AUTO: 41.3 % (ref 35–47)
HGB BLD-MCNC: 13.1 G/DL (ref 11.5–16)
IMM GRANULOCYTES # BLD AUTO: 0 K/UL (ref 0–0.04)
IMM GRANULOCYTES NFR BLD AUTO: 0 % (ref 0–0.5)
LYMPHOCYTES # BLD: 2 K/UL (ref 0.8–3.5)
LYMPHOCYTES NFR BLD: 66 % (ref 12–49)
MCH RBC QN AUTO: 27.4 PG (ref 26–34)
MCHC RBC AUTO-ENTMCNC: 31.7 G/DL (ref 30–36.5)
MCV RBC AUTO: 86.4 FL (ref 80–99)
MONOCYTES # BLD: 0.3 K/UL (ref 0–1)
MONOCYTES NFR BLD: 12 % (ref 5–13)
NEUTS SEG # BLD: 0.6 K/UL (ref 1.8–8)
NEUTS SEG NFR BLD: 20 % (ref 32–75)
NRBC # BLD: 0 K/UL (ref 0–0.01)
NRBC BLD-RTO: 0 PER 100 WBC
PATH REV BLD -IMP: ABNORMAL
PLATELET # BLD AUTO: 248 K/UL (ref 150–400)
PMV BLD AUTO: 10.8 FL (ref 8.9–12.9)
RBC # BLD AUTO: 4.78 M/UL (ref 3.8–5.2)
RBC MORPH BLD: ABNORMAL
RBC MORPH BLD: ABNORMAL
WBC # BLD AUTO: 2.9 K/UL (ref 3.6–11)

## 2024-10-14 ENCOUNTER — OFFICE VISIT (OUTPATIENT)
Facility: CLINIC | Age: 64
End: 2024-10-14
Payer: MEDICARE

## 2024-10-14 VITALS
RESPIRATION RATE: 16 BRPM | HEIGHT: 65 IN | DIASTOLIC BLOOD PRESSURE: 80 MMHG | SYSTOLIC BLOOD PRESSURE: 116 MMHG | TEMPERATURE: 98.7 F | WEIGHT: 160.6 LBS | BODY MASS INDEX: 26.76 KG/M2 | HEART RATE: 68 BPM | OXYGEN SATURATION: 100 %

## 2024-10-14 DIAGNOSIS — Z00.00 MEDICARE WELCOME EXAM: Primary | ICD-10-CM

## 2024-10-14 DIAGNOSIS — I10 PRIMARY HYPERTENSION: ICD-10-CM

## 2024-10-14 DIAGNOSIS — Z67.A1 BENIGN ETHNIC NEUTROPENIA: ICD-10-CM

## 2024-10-14 DIAGNOSIS — E66.3 OVERWEIGHT (BMI 25.0-29.9): ICD-10-CM

## 2024-10-14 DIAGNOSIS — M25.441 FINGER JOINT SWELLING, RIGHT: ICD-10-CM

## 2024-10-14 PROBLEM — N95.2 ATROPHIC VAGINITIS: Status: ACTIVE | Noted: 2024-10-14

## 2024-10-14 PROCEDURE — 3017F COLORECTAL CA SCREEN DOC REV: CPT | Performed by: INTERNAL MEDICINE

## 2024-10-14 PROCEDURE — 99214 OFFICE O/P EST MOD 30 MIN: CPT | Performed by: INTERNAL MEDICINE

## 2024-10-14 PROCEDURE — G0402 INITIAL PREVENTIVE EXAM: HCPCS | Performed by: INTERNAL MEDICINE

## 2024-10-14 PROCEDURE — G8400 PT W/DXA NO RESULTS DOC: HCPCS | Performed by: INTERNAL MEDICINE

## 2024-10-14 PROCEDURE — 1123F ACP DISCUSS/DSCN MKR DOCD: CPT | Performed by: INTERNAL MEDICINE

## 2024-10-14 PROCEDURE — 1036F TOBACCO NON-USER: CPT | Performed by: INTERNAL MEDICINE

## 2024-10-14 PROCEDURE — 1090F PRES/ABSN URINE INCON ASSESS: CPT | Performed by: INTERNAL MEDICINE

## 2024-10-14 PROCEDURE — 3079F DIAST BP 80-89 MM HG: CPT | Performed by: INTERNAL MEDICINE

## 2024-10-14 PROCEDURE — 93000 ELECTROCARDIOGRAM COMPLETE: CPT | Performed by: INTERNAL MEDICINE

## 2024-10-14 PROCEDURE — G8419 CALC BMI OUT NRM PARAM NOF/U: HCPCS | Performed by: INTERNAL MEDICINE

## 2024-10-14 PROCEDURE — G8427 DOCREV CUR MEDS BY ELIG CLIN: HCPCS | Performed by: INTERNAL MEDICINE

## 2024-10-14 PROCEDURE — 3074F SYST BP LT 130 MM HG: CPT | Performed by: INTERNAL MEDICINE

## 2024-10-14 PROCEDURE — G8484 FLU IMMUNIZE NO ADMIN: HCPCS | Performed by: INTERNAL MEDICINE

## 2024-10-14 SDOH — ECONOMIC STABILITY: INCOME INSECURITY: HOW HARD IS IT FOR YOU TO PAY FOR THE VERY BASICS LIKE FOOD, HOUSING, MEDICAL CARE, AND HEATING?: NOT HARD AT ALL

## 2024-10-14 SDOH — ECONOMIC STABILITY: FOOD INSECURITY: WITHIN THE PAST 12 MONTHS, YOU WORRIED THAT YOUR FOOD WOULD RUN OUT BEFORE YOU GOT MONEY TO BUY MORE.: NEVER TRUE

## 2024-10-14 SDOH — ECONOMIC STABILITY: FOOD INSECURITY: WITHIN THE PAST 12 MONTHS, THE FOOD YOU BOUGHT JUST DIDN'T LAST AND YOU DIDN'T HAVE MONEY TO GET MORE.: NEVER TRUE

## 2024-10-14 ASSESSMENT — PATIENT HEALTH QUESTIONNAIRE - PHQ9
SUM OF ALL RESPONSES TO PHQ QUESTIONS 1-9: 0
SUM OF ALL RESPONSES TO PHQ QUESTIONS 1-9: 0
1. LITTLE INTEREST OR PLEASURE IN DOING THINGS: NOT AT ALL
SUM OF ALL RESPONSES TO PHQ QUESTIONS 1-9: 0
SUM OF ALL RESPONSES TO PHQ9 QUESTIONS 1 & 2: 0
2. FEELING DOWN, DEPRESSED OR HOPELESS: NOT AT ALL
SUM OF ALL RESPONSES TO PHQ QUESTIONS 1-9: 0

## 2024-10-14 ASSESSMENT — VISUAL ACUITY
OS_CC: 20/20
OD_CC: 20/20

## 2024-10-14 ASSESSMENT — LIFESTYLE VARIABLES
HOW OFTEN DO YOU HAVE A DRINK CONTAINING ALCOHOL: NEVER
HOW MANY STANDARD DRINKS CONTAINING ALCOHOL DO YOU HAVE ON A TYPICAL DAY: PATIENT DOES NOT DRINK

## 2024-10-14 NOTE — PROGRESS NOTES
Medicare Annual Wellness Visit    Valeri Goncalves is here for Medicare AWV (Welcome)    Assessment & Plan  1. Welcome to Medicare visit - No Positive Risk Factors identified    2. Hypertension-Well-controlled at 116/80 today.  - Maintain current regimen of valsartan for blood pressure control  - Continue regular exercise.    3. Benign ethnic neutropenia- Asymptomatic.  - Will monitor.    4. Right index finger joint swelling - Swelling and stiffness at the right index finger PIP joint, possibly early arthritis. Improvement in stiffness reported.  - Monitor for any progression of symptoms    5. Overweight  - Continue efforts towards weight loss with regular exercise and dietary modifications    6. Health Maintenance - Received flu, COVID, RSV, and shingles vaccines. Uncertain about pneumonia vaccine; nurse to check state immunization record. Recently completed bone density test and mammogram at [facility].  - Obtain results of bone density test    Results  Discussed lab results from 10/11/24. White blood cell count was low but showed no signs of cancer. Kidney and liver tests were normal. Total cholesterol was 166, bad cholesterol was 70.8. Vitamin D was good.    Testing  EKG showed sinus bradycardia at 59 bpm but was otherwise normal.    Recommendations for Preventive Services Due: see orders and patient instructions/AVS.  Recommended screening schedule for the next 5-10 years is provided to the patient in written form: see Patient Instructions/AVS.       Return in about 6 months (around 4/14/2025), or if symptoms worsen or fail to improve, for HTN; have non-fasting labs (BMP) 1 week before appointment.       Subjective   History of Present Illness  The patient is a 64-year-old female who presents for a Welcome to Medicare visit. She had labs done a week before this appointment.    She reports feeling well overall, with a daily exercise routine that includes walking between 10,000 to 20,000 steps. Her blood

## 2024-10-14 NOTE — PATIENT INSTRUCTIONS
Personalized Preventive Plan for Valeri Goncalves - 10/14/2024  Medicare offers a range of preventive health benefits. Some of the tests and screenings are paid in full while other may be subject to a deductible, co-insurance, and/or copay.    Some of these benefits include a comprehensive review of your medical history including lifestyle, illnesses that may run in your family, and various assessments and screenings as appropriate.    After reviewing your medical record and screening and assessments performed today your provider may have ordered immunizations, labs, imaging, and/or referrals for you.  A list of these orders (if applicable) as well as your Preventive Care list are included within your After Visit Summary for your review.    Other Preventive Recommendations:    A preventive eye exam performed by an eye specialist is recommended every 1-2 years to screen for glaucoma; cataracts, macular degeneration, and other eye disorders.  A preventive dental visit is recommended every 6 months.  Try to get at least 150 minutes of exercise per week or 10,000 steps per day on a pedometer .  Order or download the FREE \"Exercise & Physical Activity: Your Everyday Guide\" from [employer]. Call 1-622.813.4154 or search [employer] online.  You need 8197-3888 mg of calcium and 9535-4545 IU of vitamin D per day. It is possible to meet your calcium requirement with diet alone, but a vitamin D supplement is usually necessary to meet this goal.  When exposed to the sun, use a sunscreen that protects against both UVA and UVB radiation with an SPF of 30 or greater. Reapply every 2 to 3 hours or after sweating, drying off with a towel, or swimming.  Always wear a seat belt when traveling in a car. Always wear a helmet when riding a bicycle or motorcycle.

## 2024-10-14 NOTE — PROGRESS NOTES
Valeri Goncalves  Identified pt with two pt identifiers(name and ).  Chief Complaint   Patient presents with    Medicare AWV     Welcome       1. Have you been to the ER, urgent care clinic since your last visit?  Hospitalized since your last visit? NO    2. Have you seen or consulted any other health care providers outside of the [facility] since your last visit?  Include any pap smears or colon screening. NO      Provider notified of reason for visit, vitals and flowsheets obtained on patients.     Patient received paperwork for advance directive during previous visit but has not completed at this time     Reviewed record In preparation for visit, huddled with provider and have obtained necessary documentation      Health Maintenance Due   Topic    Shingles vaccine (1 of 2)    Respiratory Syncytial Virus (RSV) Pregnant or age 60 yrs+ (1 - 1-dose 60+ series)    Pneumococcal 65+ years Vaccine (1 of 1 - PCV)    Annual Wellness Visit (Medicare Advantage)     Flu vaccine (1)    COVID-19 Vaccine ( season)       Wt Readings from Last 3 Encounters:   10/14/24 72.8 kg (160 lb 9.6 oz)   24 73.5 kg (162 lb)   10/02/23 73.5 kg (162 lb)     Temp Readings from Last 3 Encounters:   10/14/24 98.7 °F (37.1 °C) (Oral)   24 98 °F (36.7 °C) (Oral)   10/02/23 98.3 °F (36.8 °C) (Oral)     BP Readings from Last 3 Encounters:   10/14/24 116/80   24 134/78   10/02/23 136/72     Pulse Readings from Last 3 Encounters:   10/14/24 68   24 76   10/02/23 60          No data to display                  Learning Assessment:  :         10/2/2023    11:10 AM   [facility] AMB LEARNING ASSESSMENT   Primary Learner Patient   level of education 4 YEARS OF COLLEGE   Barriers Factors NONE   Primary Language ENGLISH   Learning Preference OTHER (COMMENT)   Answered By Patient   Relationship to Learner SELF       Fall Risk Assessment:  :         10/14/2024     8:11 AM 10/2/2023    10:56 AM   Amb Fall Risk

## 2024-11-27 ENCOUNTER — OFFICE VISIT (OUTPATIENT)
Age: 64
End: 2024-11-27

## 2024-11-27 VITALS
WEIGHT: 165 LBS | TEMPERATURE: 98.3 F | HEART RATE: 74 BPM | SYSTOLIC BLOOD PRESSURE: 145 MMHG | DIASTOLIC BLOOD PRESSURE: 80 MMHG | BODY MASS INDEX: 27.46 KG/M2 | OXYGEN SATURATION: 99 %

## 2024-11-27 DIAGNOSIS — J06.9 VIRAL URI WITH COUGH: Primary | ICD-10-CM

## 2024-11-27 ASSESSMENT — ENCOUNTER SYMPTOMS: COUGH: 1

## 2024-11-27 NOTE — PATIENT INSTRUCTIONS
History and physical today are consistent with a viral upper respiratory illness  Vital signs are stable, lung sounds are clear, physical exam is benign, patient improving with conservative treatment, no evidence of bacterial infection at this time  Treat symptomatically  Continue Zyrtec and Flonase as needed for stuffy and runny nose  Afrin over-the-counter for up to 3 days for nasal congestion  Mucinex DM over-the-counter as needed for cough and congestion  Hot tea with honey, saline sinus rinses, throat lozenges  Lots of fluid, plenty of rest  Follow up here or with PCP if symptoms persist or worsen  Go to ED if you develop any shortness of breath, chest pain or if you are unable to tolerate food or fluids

## 2024-11-27 NOTE — PROGRESS NOTES
Valeri Goncalves (:  1960) is a 64 y.o. female,New patient, here for evaluation of the following chief complaint(s):  Cough (Cough, congestion and ear pain. /X 1.5 weeks )      Assessment & Plan :  Visit Diagnoses and Associated Orders       Viral URI with cough    -  Primary                 History and physical today are consistent with a viral upper respiratory illness  Vital signs are stable, lung sounds are clear, physical exam is benign, patient improving with conservative treatment, no evidence of bacterial infection at this time  Treat symptomatically  Continue Zyrtec and Flonase as needed for stuffy and runny nose  Afrin over-the-counter for up to 3 days for nasal congestion  Mucinex DM over-the-counter as needed for cough and congestion  Hot tea with honey, saline sinus rinses, throat lozenges  Lots of fluid, plenty of rest  Follow up here or with PCP if symptoms persist or worsen  Go to ED if you develop any shortness of breath, chest pain or if you are unable to tolerate food or fluids       Subjective :    Cough         64 y.o. female presents with symptoms of improving upper respiratory symptoms which have persisted for the past 1 to 2 weeks.  She denies fevers, chills, body aches or fatigue.  In general she states that she feels quite well.  She does report some ongoing nasal congestion and postnasal drip.  She denies any significant ear or throat pain.  She does report a mild cough but denies any significant chest congestion, shortness of breath or wheezing.  Denies chest or abdominal pain, no nausea, vomiting or diarrhea.  She is taking Zyrtec, Flonase with moderate relief.  She states in general symptoms do seem to be improving, she was just concerned that they had been lingering as long as they had.         Vitals:    24 0815   BP: (!) 145/80   Site: Right Upper Arm   Position: Sitting   Cuff Size: Medium Adult   Pulse: 74   Temp: 98.3 °F (36.8 °C)   SpO2: 99%   Weight: 74.8 kg (165

## 2024-12-30 DIAGNOSIS — N95.2 ATROPHIC VAGINITIS: Primary | ICD-10-CM

## 2024-12-30 DIAGNOSIS — Z78.0 MENOPAUSE: ICD-10-CM

## 2024-12-30 RX ORDER — ESTRADIOL 0.1 MG/G
CREAM VAGINAL
Qty: 42.5 G | Refills: 0
Start: 2024-12-30

## 2025-02-03 RX ORDER — VALSARTAN 160 MG/1
160 TABLET ORAL DAILY
Qty: 90 TABLET | Refills: 3 | Status: SHIPPED | OUTPATIENT
Start: 2025-02-03

## 2025-02-03 NOTE — TELEPHONE ENCOUNTER
PCP: Lisette Carlson MD     Last appt:  10/14/2024      Future Appointments   Date Time Provider Department Center   4/14/2025  8:10 AM Lisette Carlson MD [facility] DEP          Requested Prescriptions     Pending Prescriptions Disp Refills    valsartan (DIOVAN) 160 MG tablet [Pharmacy Med Name: Valsartan Oral Tablet 160 MG] 90 tablet 3     Sig: TAKE 1 TABLET EVERY DAY

## 2025-04-10 ENCOUNTER — TELEPHONE (OUTPATIENT)
Facility: CLINIC | Age: 65
End: 2025-04-10

## 2025-04-10 SDOH — HEALTH STABILITY: PHYSICAL HEALTH: ON AVERAGE, HOW MANY DAYS PER WEEK DO YOU ENGAGE IN MODERATE TO STRENUOUS EXERCISE (LIKE A BRISK WALK)?: 7 DAYS

## 2025-04-10 ASSESSMENT — PATIENT HEALTH QUESTIONNAIRE - PHQ9
SUM OF ALL RESPONSES TO PHQ QUESTIONS 1-9: 0
SUM OF ALL RESPONSES TO PHQ QUESTIONS 1-9: 0
1. LITTLE INTEREST OR PLEASURE IN DOING THINGS: NOT AT ALL
SUM OF ALL RESPONSES TO PHQ QUESTIONS 1-9: 0
SUM OF ALL RESPONSES TO PHQ QUESTIONS 1-9: 0
2. FEELING DOWN, DEPRESSED OR HOPELESS: NOT AT ALL

## 2025-04-10 ASSESSMENT — LIFESTYLE VARIABLES
HOW MANY STANDARD DRINKS CONTAINING ALCOHOL DO YOU HAVE ON A TYPICAL DAY: 1 OR 2
HOW MANY STANDARD DRINKS CONTAINING ALCOHOL DO YOU HAVE ON A TYPICAL DAY: 1
HOW OFTEN DO YOU HAVE SIX OR MORE DRINKS ON ONE OCCASION: 1
HOW OFTEN DO YOU HAVE A DRINK CONTAINING ALCOHOL: 4
HOW OFTEN DO YOU HAVE A DRINK CONTAINING ALCOHOL: 2-3 TIMES A WEEK

## 2025-04-14 ENCOUNTER — OFFICE VISIT (OUTPATIENT)
Facility: CLINIC | Age: 65
End: 2025-04-14
Payer: MEDICARE

## 2025-04-14 VITALS
RESPIRATION RATE: 16 BRPM | TEMPERATURE: 98.2 F | OXYGEN SATURATION: 100 % | DIASTOLIC BLOOD PRESSURE: 78 MMHG | WEIGHT: 161 LBS | BODY MASS INDEX: 26.82 KG/M2 | SYSTOLIC BLOOD PRESSURE: 102 MMHG | HEART RATE: 64 BPM | HEIGHT: 65 IN

## 2025-04-14 DIAGNOSIS — I10 PRIMARY HYPERTENSION: ICD-10-CM

## 2025-04-14 DIAGNOSIS — E55.9 VITAMIN D DEFICIENCY: ICD-10-CM

## 2025-04-14 DIAGNOSIS — Z00.00 INITIAL MEDICARE ANNUAL WELLNESS VISIT: Primary | ICD-10-CM

## 2025-04-14 LAB
ANION GAP SERPL CALC-SCNC: 6 MMOL/L (ref 2–12)
BUN SERPL-MCNC: 14 MG/DL (ref 6–20)
BUN/CREAT SERPL: 19 (ref 12–20)
CALCIUM SERPL-MCNC: 9.9 MG/DL (ref 8.5–10.1)
CHLORIDE SERPL-SCNC: 107 MMOL/L (ref 97–108)
CO2 SERPL-SCNC: 29 MMOL/L (ref 21–32)
CREAT SERPL-MCNC: 0.72 MG/DL (ref 0.55–1.02)
GLUCOSE SERPL-MCNC: 87 MG/DL (ref 65–100)
POTASSIUM SERPL-SCNC: 4.4 MMOL/L (ref 3.5–5.1)
SODIUM SERPL-SCNC: 142 MMOL/L (ref 136–145)

## 2025-04-14 PROCEDURE — G0438 PPPS, INITIAL VISIT: HCPCS | Performed by: INTERNAL MEDICINE

## 2025-04-14 PROCEDURE — 1036F TOBACCO NON-USER: CPT | Performed by: INTERNAL MEDICINE

## 2025-04-14 PROCEDURE — 3074F SYST BP LT 130 MM HG: CPT | Performed by: INTERNAL MEDICINE

## 2025-04-14 PROCEDURE — G8399 PT W/DXA RESULTS DOCUMENT: HCPCS | Performed by: INTERNAL MEDICINE

## 2025-04-14 PROCEDURE — 1090F PRES/ABSN URINE INCON ASSESS: CPT | Performed by: INTERNAL MEDICINE

## 2025-04-14 PROCEDURE — 1160F RVW MEDS BY RX/DR IN RCRD: CPT | Performed by: INTERNAL MEDICINE

## 2025-04-14 PROCEDURE — 1159F MED LIST DOCD IN RCRD: CPT | Performed by: INTERNAL MEDICINE

## 2025-04-14 PROCEDURE — 3017F COLORECTAL CA SCREEN DOC REV: CPT | Performed by: INTERNAL MEDICINE

## 2025-04-14 PROCEDURE — 99213 OFFICE O/P EST LOW 20 MIN: CPT | Performed by: INTERNAL MEDICINE

## 2025-04-14 PROCEDURE — 3078F DIAST BP <80 MM HG: CPT | Performed by: INTERNAL MEDICINE

## 2025-04-14 PROCEDURE — G8419 CALC BMI OUT NRM PARAM NOF/U: HCPCS | Performed by: INTERNAL MEDICINE

## 2025-04-14 PROCEDURE — 1126F AMNT PAIN NOTED NONE PRSNT: CPT | Performed by: INTERNAL MEDICINE

## 2025-04-14 PROCEDURE — G8427 DOCREV CUR MEDS BY ELIG CLIN: HCPCS | Performed by: INTERNAL MEDICINE

## 2025-04-14 PROCEDURE — 1123F ACP DISCUSS/DSCN MKR DOCD: CPT | Performed by: INTERNAL MEDICINE

## 2025-04-14 RX ORDER — VALSARTAN 160 MG/1
160 TABLET ORAL DAILY
Qty: 90 TABLET | Refills: 3 | Status: SHIPPED | OUTPATIENT
Start: 2025-04-14

## 2025-04-14 RX ORDER — VALSARTAN 160 MG/1
160 TABLET ORAL DAILY
Qty: 90 TABLET | Refills: 3 | Status: SHIPPED | OUTPATIENT
Start: 2025-04-14 | End: 2025-04-14 | Stop reason: SDUPTHER

## 2025-04-14 SDOH — ECONOMIC STABILITY: FOOD INSECURITY: WITHIN THE PAST 12 MONTHS, THE FOOD YOU BOUGHT JUST DIDN'T LAST AND YOU DIDN'T HAVE MONEY TO GET MORE.: NEVER TRUE

## 2025-04-14 SDOH — ECONOMIC STABILITY: FOOD INSECURITY: WITHIN THE PAST 12 MONTHS, YOU WORRIED THAT YOUR FOOD WOULD RUN OUT BEFORE YOU GOT MONEY TO BUY MORE.: NEVER TRUE

## 2025-04-14 NOTE — PROGRESS NOTES
Medicare Annual Wellness Visit    Valeri Goncalves is here for Medicare AWV    Assessment & Plan  1. Initial Medicare annual wellness visit  - Mammogram scheduled.  - Provided advanced directive paperwork.  - Continue regular exercise and balanced diet.    2. Hypertension: Well-controlled. BP improved to 102/78 mmHg.  - Kidney function test today to monitor potassium and renal function.  - Basic Metabolic Panel  - Refill valsartan (DIOVAN) 160 MG tablet; Take 1 tablet by mouth daily  Dispense: 90 tablet; Refill: 3      ICD-10-CM    1. Initial Medicare annual wellness visit  Z00.00       2. Primary hypertension  I10 valsartan (DIOVAN) 160 MG tablet     Basic Metabolic Panel     CBC with Auto Differential     Comprehensive Metabolic Panel     Lipid Panel     DISCONTINUED: valsartan (DIOVAN) 160 MG tablet      3. Vitamin D deficiency  E55.9 Vitamin D 25 Hydroxy             Return in about 6 months (around 10/14/2025), or if symptoms worsen or fail to improve, for HTN; have fasting labs 1 week before appointment.       Subjective     History of Present Illness  The patient is a 64-year-old female presenting for a Medicare annual wellness visit and 6-month follow-up for hypertension.    No lightheadedness or dizziness. Maintains active lifestyle with 10,000 steps daily and balanced diet. Requests losartan refill.    Reports right eye scar tissue from cataract surgery, awaiting further treatment with Dr. Tomasz England.    Arthritis in knees, not impeding daily activities. History of right knee replacement.    No gastrointestinal symptoms or lower extremity edema.    Soc Hx  . Has 1 daughter and 1 granddaughter who live with her. 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 She does not smoke. She drinks alcohol socially, about 4 times a month. Her father is Chau Sorto who is a patient of Dr. Carlsno.     Patient's complete Health Risk Assessment and screening values have been reviewed and are found in 
Valeri Goncalves  Identified pt with two pt identifiers(name and ).  Chief Complaint   Patient presents with    Medicare AWV       1. Have you been to the ER, urgent care clinic since your last visit?  Hospitalized since your last visit? NO    2. Have you seen or consulted any other health care providers outside of the [facility] since your last visit?  Include any pap smears or colon screening. NO      Provider notified of reason for visit, vitals and flowsheets obtained on patients.     Patient received paperwork for advance directive during previous visit but has not completed at this time     Reviewed record In preparation for visit, huddled with provider and have obtained necessary documentation      Health Maintenance Due   Topic    Annual Wellness Visit (Medicare Advantage)        Wt Readings from Last 3 Encounters:   25 73 kg (161 lb)   24 74.8 kg (165 lb)   10/14/24 72.8 kg (160 lb 9.6 oz)     Temp Readings from Last 3 Encounters:   25 98.2 °F (36.8 °C) (Oral)   24 98.3 °F (36.8 °C)   10/14/24 98.7 °F (37.1 °C) (Oral)     BP Readings from Last 3 Encounters:   25 102/78   24 (!) 145/80   10/14/24 116/80     Pulse Readings from Last 3 Encounters:   25 64   24 74   10/14/24 68          No data to display                  Learning Assessment:  :         10/2/2023    11:10 AM   [facility] AMB LEARNING ASSESSMENT   Primary Learner Patient   level of education 4 YEARS OF COLLEGE   Barriers Factors NONE   Primary Language ENGLISH   Learning Preference OTHER (COMMENT)   Answered By Patient   Relationship to Learner SELF       Fall Risk Assessment:  :         4/10/2025     9:21 AM 10/14/2024     8:11 AM 10/2/2023    10:56 AM   Amb Fall Risk Assessment and TUG Test   Do you feel unsteady or are you worried about falling?  no  no no   2 or more falls in past year? no  no no   Fall with injury in past year? no  no no       Proxy-reported       Abuse 
- - -

## 2025-04-14 NOTE — PATIENT INSTRUCTIONS
Personalized Preventive Plan for Valeri Goncalves - 4/14/2025  Medicare offers a range of preventive health benefits. Some of the tests and screenings are paid in full while other may be subject to a deductible, co-insurance, and/or copay.  Some of these benefits include a comprehensive review of your medical history including lifestyle, illnesses that may run in your family, and various assessments and screenings as appropriate.  After reviewing your medical record and screening and assessments performed today your provider may have ordered immunizations, labs, imaging, and/or referrals for you.  A list of these orders (if applicable) as well as your Preventive Care list are included within your After Visit Summary for your review.

## 2025-04-18 ENCOUNTER — RESULTS FOLLOW-UP (OUTPATIENT)
Facility: CLINIC | Age: 65
End: 2025-04-18

## 2025-05-28 ENCOUNTER — TELEPHONE (OUTPATIENT)
Facility: CLINIC | Age: 65
End: 2025-05-28

## 2025-05-28 NOTE — TELEPHONE ENCOUNTER
----- Message from hortencia DONATO sent at 5/28/2025 12:55 PM EDT -----  Regarding: [facility] Appointment Request  [facility] Appointment Request    Patient needs appointment for [facility] Appointment Type: Pre-Op Visit.    Patient Requested Dates(s):as soon as possible before 06-  Patient Requested Time:morning appointment   Provider Name:Lisette Carlson MD    Reason for Appointment Request: Established Patient - Available appointments did not meet patient need.    Patient will have cataract surgery removal on 06/26/2025 but there is no available appointemtn   ------------------------------------------------------------------------------------------------------------  --------------    Relationship to Patient: Covered Entity     Call Back Information: OK to leave message on voicemail  Preferred Call Back Number: Phone [phone]

## 2025-06-10 ENCOUNTER — OFFICE VISIT (OUTPATIENT)
Facility: CLINIC | Age: 65
End: 2025-06-10
Payer: MEDICARE

## 2025-06-10 VITALS
DIASTOLIC BLOOD PRESSURE: 76 MMHG | WEIGHT: 159.8 LBS | OXYGEN SATURATION: 100 % | TEMPERATURE: 98 F | BODY MASS INDEX: 26.62 KG/M2 | HEART RATE: 68 BPM | SYSTOLIC BLOOD PRESSURE: 138 MMHG | RESPIRATION RATE: 16 BRPM | HEIGHT: 65 IN

## 2025-06-10 DIAGNOSIS — Z01.818 PRE-OP EVALUATION: Primary | ICD-10-CM

## 2025-06-10 DIAGNOSIS — H25.9 SENILE CATARACT OF LEFT EYE, UNSPECIFIED AGE-RELATED CATARACT TYPE: ICD-10-CM

## 2025-06-10 DIAGNOSIS — I10 PRIMARY HYPERTENSION: ICD-10-CM

## 2025-06-10 PROCEDURE — 3078F DIAST BP <80 MM HG: CPT | Performed by: INTERNAL MEDICINE

## 2025-06-10 PROCEDURE — G8419 CALC BMI OUT NRM PARAM NOF/U: HCPCS | Performed by: INTERNAL MEDICINE

## 2025-06-10 PROCEDURE — 1090F PRES/ABSN URINE INCON ASSESS: CPT | Performed by: INTERNAL MEDICINE

## 2025-06-10 PROCEDURE — G8399 PT W/DXA RESULTS DOCUMENT: HCPCS | Performed by: INTERNAL MEDICINE

## 2025-06-10 PROCEDURE — 1159F MED LIST DOCD IN RCRD: CPT | Performed by: INTERNAL MEDICINE

## 2025-06-10 PROCEDURE — 1126F AMNT PAIN NOTED NONE PRSNT: CPT | Performed by: INTERNAL MEDICINE

## 2025-06-10 PROCEDURE — 99214 OFFICE O/P EST MOD 30 MIN: CPT | Performed by: INTERNAL MEDICINE

## 2025-06-10 PROCEDURE — G8427 DOCREV CUR MEDS BY ELIG CLIN: HCPCS | Performed by: INTERNAL MEDICINE

## 2025-06-10 PROCEDURE — 1160F RVW MEDS BY RX/DR IN RCRD: CPT | Performed by: INTERNAL MEDICINE

## 2025-06-10 PROCEDURE — 1036F TOBACCO NON-USER: CPT | Performed by: INTERNAL MEDICINE

## 2025-06-10 PROCEDURE — 3075F SYST BP GE 130 - 139MM HG: CPT | Performed by: INTERNAL MEDICINE

## 2025-06-10 PROCEDURE — 1123F ACP DISCUSS/DSCN MKR DOCD: CPT | Performed by: INTERNAL MEDICINE

## 2025-06-10 PROCEDURE — 3017F COLORECTAL CA SCREEN DOC REV: CPT | Performed by: INTERNAL MEDICINE

## 2025-06-10 RX ORDER — AZITHROMYCIN 250 MG/1
250 TABLET, FILM COATED ORAL DAILY
COMMUNITY
Start: 2025-05-20 | End: 2025-06-10 | Stop reason: ALTCHOICE

## 2025-06-10 RX ORDER — OFLOXACIN 3 MG/ML
1 SOLUTION/ DROPS OPHTHALMIC 4 TIMES DAILY
COMMUNITY
Start: 2025-05-16

## 2025-06-10 NOTE — PROGRESS NOTES
Chief Complaint   Patient presents with    Pre-op Exam       HISTORY OF PRESENT ILLNESS  Valeri Goncalves is a 64 y.o. female    Presents for pre-operative consultation requested by Dr. Colón prior to left cataract resection surgery scheduled on 25.     Hx of Preoperative Complications  Anesthesia Reactions: no  Malignant Hypertension: no  Bleeding excessively: no  Transfusion: no  DVT/PE Hx: no     Latex allergy: no     Physical Activity Capacity:   Greater than 4 METS (e.g., walking > 4 mph, 1 flight of stairs, moderate housework or exercise)     Medication Considerations:  Patient is not taking aspirin, warfarin, or other anticoagulant medication daily.    Social History  . Never smoker. Drinks alcohol socially. No recreational drugs.    Primary Decision Maker: Lea Goncalves - Child - [phone]    Patient Active Problem List   Diagnosis    Menopause    HTN (hypertension)    Atrophic vaginitis    Overweight (BMI 25.0-29.9)    Benign ethnic neutropenia    Vitamin D deficiency     Past Medical History:   Diagnosis Date    Arthritis     Fibroids 2013    GERD (gastroesophageal reflux disease)     HTN (hypertension)     Osteoarthritis      Past Surgical History:   Procedure Laterality Date    BUNIONECTOMY Bilateral     CATARACT REMOVAL Right 2023     SECTION      COLONOSCOPY      HYSTERECTOMY, TOTAL ABDOMINAL (CERVIX REMOVED)  2013    Dr. Ortega, total laparoscopic hysterectomy, bilateral salpingo-oopherectomy    ORTHOPEDIC SURGERY Right     Arthroscopy for torn meniscus    TOTAL KNEE ARTHROPLASTY Right      Family History   Problem Relation Age of Onset    Hypertension Mother     Heart Disease Mother             Hypertension Father     Hypothyroidism Father     Arthritis Father     Hearing Loss Father     High Blood Pressure Father     No Known Problems Sister     No Known Problems Brother     No Known Problems Maternal Grandmother     Pancreatic Cancer

## 2025-06-10 NOTE — PROGRESS NOTES
Valeri Goncalves  Identified pt with two pt identifiers(name and ).  Chief Complaint   Patient presents with    Pre-op Exam       1. Have you been to the ER, urgent care clinic since your last visit?  Hospitalized since your last visit? NO    2. Have you seen or consulted any other health care providers outside of the [facility] since your last visit?  Include any pap smears or colon screening. NO      Provider notified of reason for visit, vitals and flowsheets obtained on patients.     Patient received paperwork for advance directive during previous visit but has not completed at this time     Reviewed record In preparation for visit, huddled with provider and have obtained necessary documentation      There are no preventive care reminders to display for this patient.    Wt Readings from Last 3 Encounters:   25 73 kg (161 lb)   24 74.8 kg (165 lb)   10/14/24 72.8 kg (160 lb 9.6 oz)     Temp Readings from Last 3 Encounters:   25 98.2 °F (36.8 °C) (Oral)   24 98.3 °F (36.8 °C)   10/14/24 98.7 °F (37.1 °C) (Oral)     BP Readings from Last 3 Encounters:   25 102/78   24 (!) 145/80   10/14/24 116/80     Pulse Readings from Last 3 Encounters:   25 64   24 74   10/14/24 68          No data to display                  Learning Assessment:  :         10/2/2023    11:10 AM   [facility] AMB LEARNING ASSESSMENT   Primary Learner Patient   level of education 4 YEARS OF COLLEGE   Barriers Factors NONE   Primary Language ENGLISH   Learning Preference OTHER (COMMENT)   Answered By Patient   Relationship to Learner SELF       Fall Risk Assessment:  :         4/10/2025     9:21 AM 10/14/2024     8:11 AM 10/2/2023    10:56 AM   Amb Fall Risk Assessment and TUG Test   Do you feel unsteady or are you worried about falling?  no  no no   2 or more falls in past year? no  no no   Fall with injury in past year? no  no no       Proxy-reported       Abuse Screening:  :

## 2025-09-04 ENCOUNTER — TELEPHONE (OUTPATIENT)
Facility: CLINIC | Age: 65
End: 2025-09-04